# Patient Record
Sex: FEMALE | Race: WHITE | NOT HISPANIC OR LATINO | Employment: FULL TIME | URBAN - METROPOLITAN AREA
[De-identification: names, ages, dates, MRNs, and addresses within clinical notes are randomized per-mention and may not be internally consistent; named-entity substitution may affect disease eponyms.]

---

## 2023-03-06 LAB
EXTERNAL GROUP B STREP ANTIGEN: NEGATIVE
EXTERNAL HEPATITIS B SURFACE ANTIGEN: NEGATIVE
EXTERNAL HEPATITIS C, RNA QUANT PCR: NORMAL
EXTERNAL RUBELLA QUALITATIVE: NORMAL
EXTERNAL SYPHILIS RPR SCREEN: NORMAL
EXTERNAL VDRL: NORMAL
HIV1 P24 AG SERPL QL IA: NORMAL

## 2023-04-22 ENCOUNTER — HOSPITAL ENCOUNTER (EMERGENCY)
Facility: HOSPITAL | Age: 33
Discharge: HOME | End: 2023-04-22
Attending: EMERGENCY MEDICINE
Payer: COMMERCIAL

## 2023-04-22 ENCOUNTER — APPOINTMENT (OUTPATIENT)
Dept: ULTRASOUND IMAGING | Facility: HOSPITAL | Age: 33
End: 2023-04-22
Payer: COMMERCIAL

## 2023-04-22 VITALS
TEMPERATURE: 97.1 F | HEIGHT: 65 IN | DIASTOLIC BLOOD PRESSURE: 64 MMHG | WEIGHT: 166.01 LBS | HEART RATE: 74 BPM | SYSTOLIC BLOOD PRESSURE: 109 MMHG | OXYGEN SATURATION: 99 % | BODY MASS INDEX: 27.66 KG/M2 | RESPIRATION RATE: 16 BRPM

## 2023-04-22 DIAGNOSIS — Z00.00 NORMAL EXAM: ICD-10-CM
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
## 2023-04-22 DIAGNOSIS — Z3A.14 14 WEEKS GESTATION OF PREGNANCY: ICD-10-CM

## 2023-04-22 LAB — HGB F BLD QL KLEIH BETKE: NORMAL

## 2023-04-22 PROCEDURE — 76805 OB US >/= 14 WKS SNGL FETUS: CPT

## 2023-04-22 PROCEDURE — 36415 COLL VENOUS BLD VENIPUNCTURE: CPT

## 2023-04-22 PROCEDURE — 85460 HEMOGLOBIN FETAL: CPT | Performed by: NURSE PRACTITIONER

## 2023-04-22 PROCEDURE — 99282 EMERGENCY DEPT VISIT SF MDM: CPT

## 2023-04-22 NOTE — DISCHARGE INSTRUCTIONS
Rest today.  May use Tylenol if needed for pain.  Follow-up with your OB/GYN as needed.  Return for new or worsening symptoms.
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
## 2023-10-07 ENCOUNTER — HOSPITAL ENCOUNTER (INPATIENT)
Facility: HOSPITAL | Age: 33
LOS: 2 days | Discharge: HOME | End: 2023-10-09
Attending: OBSTETRICS & GYNECOLOGY | Admitting: OBSTETRICS & GYNECOLOGY
Payer: COMMERCIAL

## 2023-10-07 ENCOUNTER — ANESTHESIA EVENT (OUTPATIENT)
Dept: LABOR AND DELIVERY | Facility: HOSPITAL | Age: 33
End: 2023-10-07
Payer: COMMERCIAL

## 2023-10-07 ENCOUNTER — ANESTHESIA (OUTPATIENT)
Dept: LABOR AND DELIVERY | Facility: HOSPITAL | Age: 33
End: 2023-10-07
Payer: COMMERCIAL

## 2023-10-07 PROBLEM — Z34.90 PREGNANT: Status: ACTIVE | Noted: 2023-10-07

## 2023-10-07 PROBLEM — Z34.90 PREGNANT: Status: RESOLVED | Noted: 2023-10-07 | Resolved: 2023-10-07

## 2023-10-07 PROBLEM — O42.92 FULL-TERM PREMATURE RUPTURE OF MEMBRANES: Status: ACTIVE | Noted: 2023-10-07

## 2023-10-07 LAB
ABO GROUP BLD: NORMAL
BLD GP AB SCN SERPL QL: NEGATIVE
DEPRECATED RDW RBC AUTO: 49 FL (ref 37–54)
ERYTHROCYTE [DISTWIDTH] IN BLOOD BY AUTOMATED COUNT: 15.4 % (ref 12.3–15.4)
HCT VFR BLD AUTO: 34.7 % (ref 34–46.6)
HGB BLD-MCNC: 11.6 G/DL (ref 12–15.9)
MCH RBC QN AUTO: 30.5 PG (ref 26.6–33)
MCHC RBC AUTO-ENTMCNC: 33.4 G/DL (ref 31.5–35.7)
MCV RBC AUTO: 91.4 FL (ref 79–97)
PLATELET # BLD AUTO: 255 10*3/MM3 (ref 140–450)
PMV BLD AUTO: 8.6 FL (ref 6–12)
RBC # BLD AUTO: 3.79 10*6/MM3 (ref 3.77–5.28)
RH BLD: POSITIVE
RPR SER QL: NORMAL
T&S EXPIRATION DATE: NORMAL
WBC NRBC COR # BLD: 10.1 10*3/MM3 (ref 3.4–10.8)

## 2023-10-07 PROCEDURE — 86900 BLOOD TYPING SEROLOGIC ABO: CPT

## 2023-10-07 PROCEDURE — 86901 BLOOD TYPING SEROLOGIC RH(D): CPT | Performed by: OBSTETRICS & GYNECOLOGY

## 2023-10-07 PROCEDURE — 0DQR0ZZ REPAIR ANAL SPHINCTER, OPEN APPROACH: ICD-10-PCS | Performed by: OBSTETRICS & GYNECOLOGY

## 2023-10-07 PROCEDURE — 25810000003 LACTATED RINGERS PER 1000 ML: Performed by: OBSTETRICS & GYNECOLOGY

## 2023-10-07 PROCEDURE — 85027 COMPLETE CBC AUTOMATED: CPT | Performed by: OBSTETRICS & GYNECOLOGY

## 2023-10-07 PROCEDURE — C1755 CATHETER, INTRASPINAL: HCPCS | Performed by: ANESTHESIOLOGY

## 2023-10-07 PROCEDURE — 86900 BLOOD TYPING SEROLOGIC ABO: CPT | Performed by: OBSTETRICS & GYNECOLOGY

## 2023-10-07 PROCEDURE — 86592 SYPHILIS TEST NON-TREP QUAL: CPT | Performed by: OBSTETRICS & GYNECOLOGY

## 2023-10-07 PROCEDURE — 86850 RBC ANTIBODY SCREEN: CPT | Performed by: OBSTETRICS & GYNECOLOGY

## 2023-10-07 PROCEDURE — 86901 BLOOD TYPING SEROLOGIC RH(D): CPT

## 2023-10-07 RX ORDER — SODIUM CHLORIDE, SODIUM LACTATE, POTASSIUM CHLORIDE, CALCIUM CHLORIDE 600; 310; 30; 20 MG/100ML; MG/100ML; MG/100ML; MG/100ML
125 INJECTION, SOLUTION INTRAVENOUS CONTINUOUS
Status: DISCONTINUED | OUTPATIENT
Start: 2023-10-07 | End: 2023-10-07

## 2023-10-07 RX ORDER — OXYTOCIN/0.9 % SODIUM CHLORIDE 30/500 ML
2-20 PLASTIC BAG, INJECTION (ML) INTRAVENOUS
Status: DISCONTINUED | OUTPATIENT
Start: 2023-10-07 | End: 2023-10-07 | Stop reason: HOSPADM

## 2023-10-07 RX ORDER — MAGNESIUM CARB/ALUMINUM HYDROX 105-160MG
30 TABLET,CHEWABLE ORAL ONCE
Status: DISCONTINUED | OUTPATIENT
Start: 2023-10-07 | End: 2023-10-07 | Stop reason: HOSPADM

## 2023-10-07 RX ORDER — HYDROCODONE BITARTRATE AND ACETAMINOPHEN 5; 325 MG/1; MG/1
1 TABLET ORAL EVERY 4 HOURS PRN
Status: DISCONTINUED | OUTPATIENT
Start: 2023-10-07 | End: 2023-10-09 | Stop reason: HOSPADM

## 2023-10-07 RX ORDER — OXYTOCIN/0.9 % SODIUM CHLORIDE 30/500 ML
250 PLASTIC BAG, INJECTION (ML) INTRAVENOUS CONTINUOUS
Status: ACTIVE | OUTPATIENT
Start: 2023-10-07 | End: 2023-10-07

## 2023-10-07 RX ORDER — PRENATAL VIT/IRON FUM/FOLIC AC 27MG-0.8MG
1 TABLET ORAL DAILY
Status: DISCONTINUED | OUTPATIENT
Start: 2023-10-07 | End: 2023-10-09 | Stop reason: HOSPADM

## 2023-10-07 RX ORDER — FLUTICASONE PROPIONATE 50 MCG
2 SPRAY, SUSPENSION (ML) NASAL DAILY
COMMUNITY

## 2023-10-07 RX ORDER — FAMOTIDINE 10 MG/ML
20 INJECTION, SOLUTION INTRAVENOUS ONCE AS NEEDED
Status: COMPLETED | OUTPATIENT
Start: 2023-10-07 | End: 2023-10-07

## 2023-10-07 RX ORDER — DOCUSATE SODIUM 100 MG/1
100 CAPSULE, LIQUID FILLED ORAL 2 TIMES DAILY
Status: DISCONTINUED | OUTPATIENT
Start: 2023-10-07 | End: 2023-10-09 | Stop reason: HOSPADM

## 2023-10-07 RX ORDER — PRENATAL VIT NO.126/IRON/FOLIC 28MG-0.8MG
1 TABLET ORAL DAILY
COMMUNITY

## 2023-10-07 RX ORDER — SODIUM CHLORIDE 0.9 % (FLUSH) 0.9 %
10 SYRINGE (ML) INJECTION AS NEEDED
Status: DISCONTINUED | OUTPATIENT
Start: 2023-10-07 | End: 2023-10-07 | Stop reason: HOSPADM

## 2023-10-07 RX ORDER — ACETAMINOPHEN 325 MG/1
650 TABLET ORAL EVERY 6 HOURS PRN
Status: DISCONTINUED | OUTPATIENT
Start: 2023-10-07 | End: 2023-10-09 | Stop reason: HOSPADM

## 2023-10-07 RX ORDER — ONDANSETRON 4 MG/1
4 TABLET, FILM COATED ORAL EVERY 8 HOURS PRN
Status: DISCONTINUED | OUTPATIENT
Start: 2023-10-07 | End: 2023-10-09 | Stop reason: HOSPADM

## 2023-10-07 RX ORDER — BISACODYL 10 MG
10 SUPPOSITORY, RECTAL RECTAL DAILY PRN
Status: DISCONTINUED | OUTPATIENT
Start: 2023-10-08 | End: 2023-10-09 | Stop reason: HOSPADM

## 2023-10-07 RX ORDER — IBUPROFEN 600 MG/1
600 TABLET ORAL EVERY 6 HOURS PRN
Status: DISCONTINUED | OUTPATIENT
Start: 2023-10-07 | End: 2023-10-09 | Stop reason: HOSPADM

## 2023-10-07 RX ORDER — CARBOPROST TROMETHAMINE 250 UG/ML
250 INJECTION, SOLUTION INTRAMUSCULAR
Status: DISCONTINUED | OUTPATIENT
Start: 2023-10-07 | End: 2023-10-07 | Stop reason: HOSPADM

## 2023-10-07 RX ORDER — LIDOCAINE HCL/EPINEPHRINE/PF 2%-1:200K
VIAL (ML) INJECTION
Status: DISPENSED
Start: 2023-10-07 | End: 2023-10-07

## 2023-10-07 RX ORDER — FENTANYL 0.2 MG/100ML-BUPIV 0.125%-NACL 0.9% EPIDURAL INJ 2/0.125 MCG/ML-%
SOLUTION INJECTION CONTINUOUS PRN
Status: DISCONTINUED | OUTPATIENT
Start: 2023-10-07 | End: 2023-10-07 | Stop reason: SURG

## 2023-10-07 RX ORDER — LIDOCAINE HYDROCHLORIDE 10 MG/ML
30 INJECTION, SOLUTION INFILTRATION; PERINEURAL ONCE
Status: DISCONTINUED | OUTPATIENT
Start: 2023-10-07 | End: 2023-10-07

## 2023-10-07 RX ORDER — HYDROCORTISONE ACETATE PRAMOXINE HCL 2.5; 1 G/100G; G/100G
1 CREAM TOPICAL AS NEEDED
Status: DISCONTINUED | OUTPATIENT
Start: 2023-10-07 | End: 2023-10-09 | Stop reason: HOSPADM

## 2023-10-07 RX ORDER — HYDROCODONE BITARTRATE AND ACETAMINOPHEN 10; 325 MG/1; MG/1
1 TABLET ORAL EVERY 4 HOURS PRN
Status: DISCONTINUED | OUTPATIENT
Start: 2023-10-07 | End: 2023-10-09 | Stop reason: HOSPADM

## 2023-10-07 RX ORDER — OXYTOCIN-SODIUM CHLORIDE 0.9% IV SOLN 30 UNIT/500ML 30-0.9/5 UT/ML-%
125 SOLUTION INTRAVENOUS CONTINUOUS PRN
Status: DISCONTINUED | OUTPATIENT
Start: 2023-10-07 | End: 2023-10-09 | Stop reason: HOSPADM

## 2023-10-07 RX ORDER — SODIUM CHLORIDE 0.9 % (FLUSH) 0.9 %
1-10 SYRINGE (ML) INJECTION AS NEEDED
Status: DISCONTINUED | OUTPATIENT
Start: 2023-10-07 | End: 2023-10-09 | Stop reason: HOSPADM

## 2023-10-07 RX ORDER — ACETAMINOPHEN 325 MG/1
650 TABLET ORAL EVERY 4 HOURS PRN
Status: DISCONTINUED | OUTPATIENT
Start: 2023-10-07 | End: 2023-10-07 | Stop reason: HOSPADM

## 2023-10-07 RX ORDER — MISOPROSTOL 200 UG/1
800 TABLET ORAL ONCE AS NEEDED
Status: DISCONTINUED | OUTPATIENT
Start: 2023-10-07 | End: 2023-10-07 | Stop reason: HOSPADM

## 2023-10-07 RX ORDER — METHYLERGONOVINE MALEATE 0.2 MG/ML
200 INJECTION INTRAVENOUS ONCE AS NEEDED
Status: DISCONTINUED | OUTPATIENT
Start: 2023-10-07 | End: 2023-10-07 | Stop reason: HOSPADM

## 2023-10-07 RX ORDER — FENTANYL 0.2 MG/100ML-BUPIV 0.125%-NACL 0.9% EPIDURAL INJ 2/0.125 MCG/ML-%
SOLUTION INJECTION
Status: COMPLETED
Start: 2023-10-07 | End: 2023-10-07

## 2023-10-07 RX ORDER — OXYTOCIN/0.9 % SODIUM CHLORIDE 30/500 ML
999 PLASTIC BAG, INJECTION (ML) INTRAVENOUS ONCE
Status: COMPLETED | OUTPATIENT
Start: 2023-10-07 | End: 2023-10-07

## 2023-10-07 RX ADMIN — FAMOTIDINE 20 MG: 10 INJECTION INTRAVENOUS at 13:37

## 2023-10-07 RX ADMIN — Medication 999 ML/HR: at 14:32

## 2023-10-07 RX ADMIN — Medication 1 G: at 16:54

## 2023-10-07 RX ADMIN — Medication 250 ML/HR: at 14:50

## 2023-10-07 RX ADMIN — SERTRALINE 50 MG: 50 TABLET, FILM COATED ORAL at 21:26

## 2023-10-07 RX ADMIN — IBUPROFEN 600 MG: 600 TABLET, FILM COATED ORAL at 16:54

## 2023-10-07 RX ADMIN — WITCH HAZEL: 500 SOLUTION RECTAL; TOPICAL at 16:54

## 2023-10-07 RX ADMIN — IBUPROFEN 600 MG: 600 TABLET, FILM COATED ORAL at 23:51

## 2023-10-07 RX ADMIN — PRENATAL VITAMINS-IRON FUMARATE 27 MG IRON-FOLIC ACID 0.8 MG TABLET 1 TABLET: at 21:24

## 2023-10-07 RX ADMIN — Medication 2 MILLI-UNITS/MIN: at 07:23

## 2023-10-07 RX ADMIN — SODIUM CHLORIDE, POTASSIUM CHLORIDE, SODIUM LACTATE AND CALCIUM CHLORIDE 125 ML/HR: 600; 310; 30; 20 INJECTION, SOLUTION INTRAVENOUS at 07:23

## 2023-10-07 RX ADMIN — SODIUM CHLORIDE, POTASSIUM CHLORIDE, SODIUM LACTATE AND CALCIUM CHLORIDE 125 ML/HR: 600; 310; 30; 20 INJECTION, SOLUTION INTRAVENOUS at 11:32

## 2023-10-07 RX ADMIN — DOCUSATE SODIUM 100 MG: 100 CAPSULE, LIQUID FILLED ORAL at 21:26

## 2023-10-07 RX ADMIN — Medication 8 ML/HR: at 09:58

## 2023-10-07 NOTE — L&D DELIVERY NOTE
[facility]  Vaginal Delivery Note    Diagnosis     Patient is a 33 y.o. female  currently at 38w2d, who presents with SROM.  Vaginal Septum    Delivery     Delivery:  Spontaneous Vaginal Delivery    Date of Delivery:  10/7/2023   Anesthesia:  Epidural   Delivering clinician: Kaylie Felder MD      Delivery narrative:  Pt presented c SROM at term.  She was augmented c pitocin.  She progressed on a normal labor curve with a category 1 tracing throughout.  She pushed with excellent effort for just under two hours to deliver a vigorous infant without difficulty.         Infant    Findings: VFI     Apgars: 8 & 9 at 1 and 5 minutes.      Placenta, Cord, and Fluid     Delayed cord clamping performed per routine.       Placenta delivered spontaneous, intact  3VC      Bimanual massage and Pitocin 30 units in 500 mL bolus given after placental delivery.  There was good hemostasis and a firm uterine tone.        Lacerations       Partial third degree laceration where the torn portion of the rectal sphincter was repaired c 0 vicryl in several figure of eight sutures and the second degree portion of the laceration was, repaired c 3.0 vicryl rapide in the usual fashion. A rectal exam post repair confirmed the rectum was free of suture and free of defect.     Pt also had a small L sided vaginal septum which tore during pushing.  The remaining tissue fragments were excised anterior and posteriorly and the vaginal mucosa in those areas was repaired c 3.0 vicryl rapide in several figure of eight sutures.       Quantitiative Blood Loss  400  mL     Sponge and needle counts correct x 2.     Disposition  Mother to Mother Baby/Postpartum  in stable condition currently.  Baby to remains with mom  in stable condition currently.      Kaylie Felder MD  10/07/23  15:03 EDT

## 2023-10-07 NOTE — H&P
[facility]  Obstetric History and Physical     Chief Complaint: SROM    Subjective     Patient is a 33 y.o. female  currently at 38+2 , who presents with SROM at 0400 this am.  She reports good FM, no VB.    Her prenatal care is benign.  Her previous obstetric/gynecological history is noted for she has a small L sided vaginal septum that is non obstructing.      Prenatal Information:  See office H&P for full details and labs.      Past OB History:       OB History    Para Term  AB Living   1 0 0 0 0 0   SAB IAB Ectopic Molar Multiple Live Births   0 0 0 0 0 0               Past Medical History: History reviewed. No pertinent past medical history.     Past Surgical History History reviewed. No pertinent surgical history.     Family History: History reviewed. No pertinent family history.   Social History:  reports that she has never smoked. She has never been exposed to tobacco smoke. She has never used smokeless tobacco.   reports no history of alcohol use.   reports no history of drug use.        General ROS: Pertinent items are noted in HPI    Objective      Vitals:     Vitals:    10/07/23 0945 10/07/23 0949 10/07/23 0950 10/07/23 0952   BP: 129/71 131/73 120/68 130/73   BP Location:       Pulse: 81 78 80 79   Resp:       Temp:       TempSrc:       SpO2:  100%     Weight:       Height:           Fetal Heart Rate Assessment:   Category 1    [place]:   q 3     Physical Exam:     General Appearance:    Alert, cooperative, in no acute distress   Abdomen:     Soft, non-tender, no guarding, no rebound tenderness,       EFW 7#0oz - 7#8oz   Pelvic Exam:    Pelvis adequate.    Presentation: Vertex    Cervix: was checked (by me): 4 cm / complete % / -1, grossly ruptured   Extremities:   Moves all extremities well, no edema, no cyanosis, no              redness   Skin:   No bleeding, bruising or rash   Neurologic:   No focal neurologic defect          Laboratory Results:   Lab Results (last 48 hours)        Procedure Component Value Units Date/Time    Group B Streptococcus Culture - Swab, Vaginal/Rectum [[id number]] Resulted: 03/06/23    Specimen: Swab from Vaginal/Rectum Updated: 10/07/23 0812     External Strep Group B Ag Negative    Hepatitis C RNA, Quantitative, PCR (graph) [[id number]] Resulted: 03/06/23    Specimen: Blood Updated: 10/07/23 0812     External Hepatitis C, RNA Quant PCR NR    External VDRL [[id number]] Resulted: 03/06/23    Specimen: Blood Updated: 10/07/23 0812     VDRL NR    Hepatitis B Surface Antigen [[id number]] Resulted: 03/06/23    Specimen: Blood Updated: 10/07/23 0812     External Hepatitis B Surface Ag Negative    RPR [[id number]] Resulted: 03/06/23    Specimen: Blood Updated: 10/07/23 0812     External RPR Non-Reactive    Rubella Antibody, IgG [[id number]] Resulted: 03/06/23    Specimen: Blood Updated: 10/07/23 0812     External Rubella Qual Immune    HIV-1 Antibody, EIA [[id number]] Resulted: 03/06/23    Specimen: Blood Updated: 10/07/23 0812     External HIV Antibody Non-Reactive    CBC (No Diff) [[id number]]  (Abnormal) Collected: 10/07/23 0641    Specimen: Blood from Arm, Left Updated: 10/07/23 0725     WBC 10.10 10*3/mm3      RBC 3.79 10*6/mm3      Hemoglobin 11.6 g/dL      Hematocrit 34.7 %      MCV 91.4 fL      MCH 30.5 pg      MCHC 33.4 g/dL      RDW 15.4 %      RDW-SD 49.0 fl      MPV 8.6 fL      Platelets 255 10*3/mm3     RPR [[id number]] Collected: 10/07/23 0641    Specimen: Blood from Arm, Left Updated: 10/07/23 0722               Assessment & Plan     Principal Problem:    Pregnant         Assessment:  1.  Intrauterine pregnancy at 38+2 wks gestation.    2.   Term SROM  3.  GBS status:Negative    Plan:  1. Augmentation c pitocin.   2. Plan of care has been reviewed with patient.  3.  Risks, benefits of treatment plan have been discussed.  4.  All questions have been answered.         Kaylie Felder MD   10/7/2023   09:52 EDT

## 2023-10-07 NOTE — ANESTHESIA PREPROCEDURE EVALUATION
Anesthesia Evaluation     Patient summary reviewed and Nursing notes reviewed   NPO Solid Status: > 8 hours  NPO Liquid Status: > 2 hours           Airway   Mallampati: III  TM distance: >3 FB  Neck ROM: full  Large neck circumference  Dental - normal exam     Pulmonary - negative pulmonary ROS   (+) ,decreased breath sounds  Cardiovascular - normal exam  Exercise tolerance: good (4-7 METS)    ECG reviewed        Neuro/Psych- negative ROS  GI/Hepatic/Renal/Endo - negative ROS     Musculoskeletal     Abdominal   (+) obese   Substance History      OB/GYN    (+) Pregnant        Other        ROS/Med Hx Other: 10/07/23 06:41  WBC: 10.10  RBC: 3.79  Hemoglobin: 11.6 (L)  Hematocrit: 34.7  Platelets: 255      (L): Data is abnormally low                Anesthesia Plan    ASA 2     epidural       Anesthetic plan, risks, benefits, and alternatives have been provided, discussed and informed consent has been obtained with: patient.    CODE STATUS:    Level Of Support Discussed With: Patient  Code Status (Patient has no pulse and is not breathing): CPR (Attempt to Resuscitate)  Medical Interventions (Patient has pulse or is breathing): Full Support

## 2023-10-07 NOTE — ANESTHESIA PROCEDURE NOTES
Labor Epidural    Pre-sedation assessment completed: 10/7/2023 9:25 AM    Patient reassessed immediately prior to procedure    Patient location during procedure: OB  Start Time: 10/7/2023 9:26 AM  Stop Time: 10/7/2023 9:40 AM  Performed By  Anesthesiologist: Cody Menchaca MD  Preanesthetic Checklist  Completed: patient identified, IV checked, site marked, risks and benefits discussed, surgical consent, monitors and equipment checked, pre-op evaluation and timeout performed  Prep:  Pt Position:sitting  Sterile Tech:cap, gloves, gown, mask and sterile barrier  Prep:chlorhexidine gluconate and isopropyl alcohol  Monitoring:blood pressure monitoring, continuous pulse oximetry and EKG  Epidural Block Procedure:  Approach:midline  Guidance:landmark technique and palpation technique  Location:L3-L4  Needle Type:Tuohy  Needle Gauge:17 G  Loss of Resistance Medium: air  Loss of Resistance: 6cm  Cath Depth at skin:11 cm  Paresthesia: none  Aspiration:negative  Test Dose:negative  Number of Attempts: 1  Post Assessment:  Dressing:biopatch applied, occlusive dressing applied and secured with tape  Pt Tolerance:patient tolerated the procedure well with no apparent complications  Complications:no

## 2023-10-08 LAB
BASOPHILS # BLD AUTO: 0 10*3/MM3 (ref 0–0.2)
BASOPHILS NFR BLD AUTO: 0.3 % (ref 0–1.5)
DEPRECATED RDW RBC AUTO: 52.5 FL (ref 37–54)
EOSINOPHIL # BLD AUTO: 0.1 10*3/MM3 (ref 0–0.4)
EOSINOPHIL NFR BLD AUTO: 0.5 % (ref 0.3–6.2)
ERYTHROCYTE [DISTWIDTH] IN BLOOD BY AUTOMATED COUNT: 15.6 % (ref 12.3–15.4)
HCT VFR BLD AUTO: 31.1 % (ref 34–46.6)
HGB BLD-MCNC: 10.2 G/DL (ref 12–15.9)
LYMPHOCYTES # BLD AUTO: 3.1 10*3/MM3 (ref 0.7–3.1)
LYMPHOCYTES NFR BLD AUTO: 22.9 % (ref 19.6–45.3)
MCH RBC QN AUTO: 30.2 PG (ref 26.6–33)
MCHC RBC AUTO-ENTMCNC: 32.9 G/DL (ref 31.5–35.7)
MCV RBC AUTO: 91.8 FL (ref 79–97)
MONOCYTES # BLD AUTO: 0.7 10*3/MM3 (ref 0.1–0.9)
MONOCYTES NFR BLD AUTO: 5 % (ref 5–12)
NEUTROPHILS NFR BLD AUTO: 71.3 % (ref 42.7–76)
NEUTROPHILS NFR BLD AUTO: 9.6 10*3/MM3 (ref 1.7–7)
NRBC BLD AUTO-RTO: 0.1 /100 WBC (ref 0–0.2)
PLATELET # BLD AUTO: 209 10*3/MM3 (ref 140–450)
PMV BLD AUTO: 8.2 FL (ref 6–12)
RBC # BLD AUTO: 3.39 10*6/MM3 (ref 3.77–5.28)
WBC NRBC COR # BLD: 13.4 10*3/MM3 (ref 3.4–10.8)

## 2023-10-08 PROCEDURE — 85025 COMPLETE CBC W/AUTO DIFF WBC: CPT | Performed by: OBSTETRICS & GYNECOLOGY

## 2023-10-08 RX ADMIN — SERTRALINE 50 MG: 50 TABLET, FILM COATED ORAL at 10:30

## 2023-10-08 RX ADMIN — IBUPROFEN 600 MG: 600 TABLET, FILM COATED ORAL at 18:41

## 2023-10-08 RX ADMIN — DOCUSATE SODIUM 100 MG: 100 CAPSULE, LIQUID FILLED ORAL at 20:42

## 2023-10-08 RX ADMIN — PRENATAL VITAMINS-IRON FUMARATE 27 MG IRON-FOLIC ACID 0.8 MG TABLET 1 TABLET: at 09:11

## 2023-10-08 RX ADMIN — DOCUSATE SODIUM 100 MG: 100 CAPSULE, LIQUID FILLED ORAL at 09:11

## 2023-10-08 NOTE — PLAN OF CARE
Goal Outcome Evaluation:  Plan of Care Reviewed With: patient        Progress: improving          Mom is voiding appropriately and her pain is being managed with motrin. Mom has been up most of the night doing skin to skin with baby. Mom and baby are bonding well. Will continue to monitor

## 2023-10-08 NOTE — PLAN OF CARE
Goal Outcome Evaluation:  Plan of Care Reviewed With: patient        Progress: improving       Pt postpartum bleeding WNL.  Ambulating and voiding appropriately. Pt states pain is controlled with PRN medications and ice packs.  Breastfeeding improving after working with lactation. Bonding well with infant.  VSS. No concerns at this time.

## 2023-10-08 NOTE — PROGRESS NOTES
[facility]  Postpartum Note    Subjective   Postpartum Day 1:  Spontaneous Vaginal Delivery    Patient without complaints. Her pain is well controlled with nonsteroidal anti-inflammatory drugs. She is ambulating and voiding well.  Bleeding is appropriate for pp period.      Objective     Vitals:  Vitals:    10/08/23 0437 10/08/23 0734 10/08/23 1115 10/08/23 1411   BP: 120/71 114/70 115/65 107/63   BP Location: Right arm Left arm Right arm Right arm   Patient Position: Lying Lying Lying Lying   Pulse: 83 75 83 77   Resp: 15 20 18 18   Temp: 98.1 øF (36.7 øC) 97.5 øF (36.4 øC) 98.4 øF (36.9 øC) 97.6 øF (36.4 øC)   TempSrc: Oral Oral Oral Oral   SpO2: 96% 96% 99% 96%   Weight:       Height:           Physical Exam:  General:  no acute distress.  Abdomen: Fundus firm below umbilicus, nontender  Extremities: moves all extremities well, no cyanosis or erythema, No edema      Labs:  Results from last 7 days   Lab Units 10/08/23  0540 10/07/23  0641   WBC 10*3/mm3 13.40* 10.10   HEMOGLOBIN g/dL 10.2* 11.6*   HEMATOCRIT % 31.1* 34.7   PLATELETS 10*3/mm3 209 255              Feeding method: Breastfeeding Status: Yes     Blood Type: RH Positive        Assessment & Plan     Principal Problem:    Full-term premature rupture of membranes  Active Problems:     (normal spontaneous vaginal delivery)      Priti HALIE Hollis is Day 1  post-partum from a  Spontaneous Vaginal Delivery      Plan:  Continue current care.      Kaylie Felder MD  10/8/2023  15:46 EDT

## 2023-10-08 NOTE — LACTATION NOTE
This note was copied from a baby's chart.  Provided mother with handouts, nipple cream and gel pads. Basic teaching done. Denies history of breast surgery. Routine medications include: zoloft, claritin, and flonaise. Denies allergy to wool. Has a Motif pump at home. Mother was given a nipple shield last night. Colostrum very easily expressed. Baby will latch but not suck. Discussed temporary use and cleaning of nipple shield. Baby latched to shield and fed for 15 minutes with audible swallowing. Instructed on gentle stimulation to keep baby feeding. Discussed nutritive feeding vs. Non-nutritive feeding. Mother reported increase in uterine cramping as feeding progressed. Encouraged to call as needed.

## 2023-10-09 VITALS
HEART RATE: 64 BPM | WEIGHT: 190.4 LBS | SYSTOLIC BLOOD PRESSURE: 125 MMHG | TEMPERATURE: 98.5 F | DIASTOLIC BLOOD PRESSURE: 76 MMHG | RESPIRATION RATE: 18 BRPM | HEIGHT: 65 IN | BODY MASS INDEX: 31.72 KG/M2 | OXYGEN SATURATION: 95 %

## 2023-10-09 PROBLEM — O42.92 FULL-TERM PREMATURE RUPTURE OF MEMBRANES: Status: RESOLVED | Noted: 2023-10-07 | Resolved: 2023-10-09

## 2023-10-09 PROCEDURE — 97161 PT EVAL LOW COMPLEX 20 MIN: CPT

## 2023-10-09 RX ORDER — IBUPROFEN 600 MG/1
600 TABLET ORAL EVERY 6 HOURS PRN
Qty: 30 TABLET | Refills: 1 | Status: SHIPPED | OUTPATIENT
Start: 2023-10-09

## 2023-10-09 RX ORDER — PSEUDOEPHEDRINE HCL 30 MG
100 TABLET ORAL 2 TIMES DAILY
Qty: 30 CAPSULE | Refills: 1 | Status: SHIPPED | OUTPATIENT
Start: 2023-10-09

## 2023-10-09 RX ADMIN — PRENATAL VITAMINS-IRON FUMARATE 27 MG IRON-FOLIC ACID 0.8 MG TABLET 1 TABLET: at 08:39

## 2023-10-09 RX ADMIN — WITCH HAZEL: 500 SOLUTION RECTAL; TOPICAL at 12:18

## 2023-10-09 RX ADMIN — SERTRALINE 50 MG: 50 TABLET, FILM COATED ORAL at 08:39

## 2023-10-09 RX ADMIN — Medication 1 G: at 12:18

## 2023-10-09 RX ADMIN — DOCUSATE SODIUM 100 MG: 100 CAPSULE, LIQUID FILLED ORAL at 08:39

## 2023-10-09 NOTE — PLAN OF CARE
Goal Outcome Evaluation:  Plan of Care Reviewed With: patient, spouse        Progress: improving       Pt postpartum bleeding WNL.  Ambulating and voiding appropriately.  Pt states pain is controlled at this time. Bonding well with infant. Breastfeeding going well. Pt seen by APRN and appropriate for discharge.  Discharge teaching provided and questions answered.  No concerns at this time.

## 2023-10-09 NOTE — THERAPY EVALUATION
Patient Name: Priti Shafer  : 1990    MRN: [id number]                              Today's Date: 10/9/2023       Admit Date: 10/7/2023    Visit Dx: No diagnosis found.  Patient Active Problem List   Diagnosis   (none) - all problems resolved or deleted     History reviewed. No pertinent past medical history.  History reviewed. No pertinent surgical history.   General Information       Row Name 10/09/23 1309          Physical Therapy Time and Intention    Document Type evaluation  -     Mode of Treatment physical therapy  -       Row Name 10/09/23 1309          General Information    Patient Profile Reviewed yes  -HC     Prior Level of Function independent:  -       Row Name 10/09/23 1309          Living Environment    People in Home spouse  -               User Key  (r) = Recorded By, (t) = Taken By, (c) = Cosigned By      Initials Name Provider Type     Daniela Hebert, PT Physical Therapist                   Mobility    No documentation.                  Obj/Interventions    No documentation.                  Goals/Plan    No documentation.                  Clinical Impression       Row Name 10/09/23 1311          Pain    Additional Documentation Pain Scale: FACES Pre/Post-Treatment (Group)  -       Row Name 10/09/23 1311          Pain Scale: FACES Pre/Post-Treatment    Pain: FACES Scale, Pretreatment 2-->hurts little bit  -     Posttreatment Pain Rating 2-->hurts little bit  -       Row Name 10/09/23 1311          Plan of Care Review    Plan of Care Reviewed With patient  -HC     Outcome Evaluation Pt is 32 yo female,  at 38w2d s/p vaginal birth with 3rd degree laceration.  Pt provided and educated on handout regarding postpartum healing post vaginal birth. Pt had no questions at end of education and handout left with pt. Pt educated to reach out to PT while in the hospital or OB at d/c regarding any questions or pelvic floor therapy needs.  -       Row Name 10/09/23 1315           Therapy Assessment/Plan (PT)    Criteria for Skilled Interventions Met (PT) no  -HC     Therapy Frequency (PT) evaluation only  -               User Key  (r) = Recorded By, (t) = Taken By, (c) = Cosigned By      Initials Name Provider Type    Danieal Ames, PT Physical Therapist                   Outcome Measures       Row Name 10/09/23 1314          How much help from another person do you currently need...    Turning from your back to your side while in flat bed without using bedrails? 4  -HC     Moving from lying on back to sitting on the side of a flat bed without bedrails? 4  -HC     Moving to and from a bed to a chair (including a wheelchair)? 4  -HC     Standing up from a chair using your arms (e.g., wheelchair, bedside chair)? 4  -HC     Climbing 3-5 steps with a railing? 4  -HC     To walk in hospital room? 4  -HC     AM-PAC 6 Clicks Score (PT) 24  -HC     Highest level of mobility 8 --> Walked 250 feet or more  -       Row Name 10/09/23 1314          Functional Assessment    Outcome Measure Options AM-PAC 6 Clicks Basic Mobility (PT)  -               User Key  (r) = Recorded By, (t) = Taken By, (c) = Cosigned By      Initials Name Provider Type    Daniela Ames, PT Physical Therapist                                 Physical Therapy Education       Title: PT OT SLP Therapies (Done)       Topic: Physical Therapy (Done)       Point: Mobility training (Done)       Learning Progress Summary             Patient Acceptance, E, VU by  at 10/9/2023 1315                         Point: Home exercise program (Done)       Learning Progress Summary             Patient Acceptance, E, VU by  at 10/9/2023 1315                         Point: Body mechanics (Done)       Learning Progress Summary             Patient Acceptance, E, VU by  at 10/9/2023 1315                         Point: Precautions (Done)       Learning Progress Summary             Patient Acceptance, E, VU by  at 10/9/2023 1315                                          User Key       Initials Effective Dates Name Provider Type Discipline     21 -  Daniela Hebert, PT Physical Therapist PT                  PT Recommendation and Plan     Plan of Care Reviewed With: patient  Outcome Evaluation: Pt is 32 yo female,  at 38w2d s/p vaginal birth with 3rd degree laceration.  Pt provided and educated on handout regarding postpartum healing post vaginal birth. Pt had no questions at end of education and handout left with pt. Pt educated to reach out to PT while in the hospital or OB at d/c regarding any questions or pelvic floor therapy needs.     Time Calculation:   PT Evaluation Complexity  History, PT Evaluation Complexity: 1-2 personal factors and/or comorbidities  Examination of Body Systems (PT Eval Complexity): 1-2 elements  Clinical Presentation (PT Evaluation Complexity): stable  Clinical Decision Making (PT Evaluation Complexity): low complexity  Overall Complexity (PT Evaluation Complexity): low complexity     PT Charges       Row Name 10/09/23 1315             Time Calculation    Start Time 912  -HC      Stop Time 922  -      Time Calculation (min) 10 min  -      PT Received On 10/09/23  -         Time Calculation- PT    Total Timed Code Minutes- PT 0 minute(s)  -                User Key  (r) = Recorded By, (t) = Taken By, (c) = Cosigned By      Initials Name Provider Type     Daniela Hebert, PT Physical Therapist                  Therapy Charges for Today       Code Description Service Date Service Provider Modifiers Qty    28113916888  PT EVAL LOW COMPLEXITY 2 10/9/2023 Daniela Hebert, PT GP 1            PT G-Codes  Outcome Measure Options: AM-PAC 6 Clicks Basic Mobility (PT)  AM-PAC 6 Clicks Score (PT): 24  PT Discharge Summary  Anticipated Discharge Disposition (PT): home    Daniela Hebert PT  10/9/2023

## 2023-10-09 NOTE — SIGNIFICANT NOTE
Case Management Discharge Note                Selected Continued Care - Discharged on 10/9/2023 Admission date: 10/7/2023 - Discharge disposition: Home or Self Care              Transportation Services  Private: Car    Final Discharge Disposition Code: (P) 01 - home or self-care

## 2023-10-09 NOTE — PLAN OF CARE
Goal Outcome Evaluation:  Plan of Care Reviewed With: patient           Outcome Evaluation: Pt is 32 yo female,  at 38w2d s/p vaginal birth with 3rd degree laceration.  Pt provided and educated on handout regarding postpartum healing post vaginal birth. Pt had no questions at end of education and handout left with pt. Pt educated to reach out to PT while in the hospital or OB at d/c regarding any questions or pelvic floor therapy needs.      Anticipated Discharge Disposition (PT): home

## 2023-10-09 NOTE — DISCHARGE SUMMARY
[facility]  Delivery Discharge Summary    Primary OB Clinician: Kaylie Felder MD    Admission Diagnosis:  Active Problems:    * No active hospital problems. *  38w2d IUP  SROM  Vaginal septum    Discharge Diagnosis:  Same, delivered    Gestational Age: 38w2d    Date of Delivery: 10/7/2023     Delivered By:  Kaylie Felder     Delivery Type: Vaginal, Spontaneous      Tubal Ligation: n/a    Intrapartum Course: Uncomplicated delivery.     Postpartum Course:  Pt was admitted and underwent  Spontaneous Vaginal Delivery. Pt was transferred to PP where she had an uncomplicated course. Pt remained AFVSS, had scant lochia and pain was well controlled. Pt d/c home in stable condition and will f/u in office for PP visit as scheduled or PRN. Currently breastfeeding. Plans on IUD  for contraception.     Physical Exam:    Vitals:   Vitals:    10/08/23 1411 10/08/23 2101 10/08/23 2315 10/09/23 0741   BP: 107/63  116/71 125/76   BP Location: Right arm  Left arm Left arm   Patient Position: Lying  Lying Lying   Pulse: 77  83 64   Resp: 18  19 18   Temp: 97.6 øF (36.4 øC)  98.9 øF (37.2 øC) 98.5 øF (36.9 øC)   TempSrc: Oral  Oral Oral   SpO2: 96%  95% 95%   Weight:  86.4 kg (190 lb 6.4 oz)     Height:         Temp (24hrs), Av.4 øF (36.9 øC), Min:97.6 øF (36.4 øC), Max:98.9 øF (37.2 øC)      General Appearance:    Alert, cooperative, in no acute distress   Abdomen:     Soft non-tender, non-distended, no guarding, no rebound         tenderness.   Extremities:   Moves all extremities well, no edema, no cyanosis, no              Redness.   Incision:   N/a   Fundus:   Firm, below umbilicus     Feeding method: Breastfeeding Status: Yes    Labs:  Results from last 7 days   Lab Units 10/08/23  0540 10/07/23  0641   WBC 10*3/mm3 13.40* 10.10   HEMOGLOBIN g/dL 10.2* 11.6*   HEMATOCRIT % 31.1* 34.7   PLATELETS 10*3/mm3 209 255           Blood Type: RH Positive      Plan:  Discharge to home.    Follow-up appointment with Dr Felder in  6 weeks.    Bertha Lennon, APRN  10/9/2023  09:45 EDT      /d

## 2023-10-09 NOTE — LACTATION NOTE
This note was copied from a baby's chart.  Mother reports feedings are going well. Breasts are starting to feel some changes. States she hears more audible swallowing with feedings. Nipples improving. Verbalizes confidence with feedings. Plans for discharge today. Discussed first night at home. Provided with  discharge weight ticket and lactation contact card. Encouraged to call as needed.

## 2023-10-11 ENCOUNTER — DOCUMENTATION (OUTPATIENT)
Dept: LACTATION | Facility: HOSPITAL | Age: 33
End: 2023-10-11
Payer: COMMERCIAL

## 2023-10-11 NOTE — PROGRESS NOTES
Returned mothers call, addressed concerns between crying inconsolably & sleepy, not latching & feeding difficulties, tips on how to improve latch, she has had breast fullness, pumping and feeding, will do skin to skin, continue to offer breast, may use breast shield as needed.  Las weight from peds appt yesterday was 5 lbs 15 oz. Voiding 5 stooling 5 times a day, rest and eating, drinking fluid as needed encouraged, will call as needed.

## 2024-10-25 ENCOUNTER — PHARMACY IMMUNIZATION REVIEW (OUTPATIENT)
Dept: PHARMACY | Facility: HOSPITAL | Age: 34
End: 2024-10-25
Payer: COMMERCIAL

## 2024-10-25 NOTE — PROGRESS NOTES
[facility] Vaccination Administration    [facility] reported to [facility] via referral on 10/25/2024    Allergies:    Patient has no known allergies.    Vaccine History:   Immunization History   Administered Date(s) Administered    COVID-19 (MODERNA) 1st,2nd,3rd Dose Monovalent 01/08/2021, 02/02/2021, 01/21/2022    COVID-19 (MODERNA) BIVALENT 12+YRS 12/14/2022    COVID-19 (PFIZER) 12YRS+ (COMIRNATY) 10/25/2024    Fluzone  >6mos 10/25/2024       Plan:    The following vaccines were administered today:  Influenza and COVID-19    Geena Macias PharmD  10/25/2024  11:17 EDT